# Patient Record
Sex: MALE | Race: WHITE | Employment: FULL TIME | URBAN - METROPOLITAN AREA
[De-identification: names, ages, dates, MRNs, and addresses within clinical notes are randomized per-mention and may not be internally consistent; named-entity substitution may affect disease eponyms.]

---

## 2017-10-05 ENCOUNTER — OFFICE VISIT (OUTPATIENT)
Dept: ORTHOPEDIC SURGERY | Age: 42
End: 2017-10-05

## 2017-10-05 VITALS
BODY MASS INDEX: 34.07 KG/M2 | HEART RATE: 85 BPM | DIASTOLIC BLOOD PRESSURE: 81 MMHG | WEIGHT: 230 LBS | HEIGHT: 69 IN | SYSTOLIC BLOOD PRESSURE: 117 MMHG

## 2017-10-05 DIAGNOSIS — S93.401A SEVERE ANKLE SPRAIN, RIGHT, INITIAL ENCOUNTER: Primary | ICD-10-CM

## 2017-10-05 PROCEDURE — 99203 OFFICE O/P NEW LOW 30 MIN: CPT | Performed by: ORTHOPAEDIC SURGERY

## 2017-10-05 PROCEDURE — L4361 PNEUMA/VAC WALK BOOT PRE OTS: HCPCS | Performed by: ORTHOPAEDIC SURGERY

## 2017-10-05 NOTE — MR AVS SNAPSHOT
worsening diseases associated with obesity. Learn more at: [email or web address]             Medications and Orders      Your Current Medications Are              buprenorphine-naloxone (SUBOXONE) 8-2 MG FILM SL film Place 1 Film under the tongue daily    ibuprofen (ADVIL;MOTRIN) 800 MG tablet Take 1 tablet by mouth every 8 hours as needed for Pain      Allergies           No Known Allergies      We Ordered/Performed the following           BREG TALL GENISUS WALKING BOOT     Comments:    Patient was prescribed a Breg Tall Genisus Walking Boot. The right ankle will require stabilization / immobilization from this semi-rigid / rigid orthosis to improve their function. The orthosis will assist in protecting the affected area, provide functional support and facilitate healing. The patient was educated and fit by a healthcare professional with expert knowledge and specialization in brace application while under the direct supervision of the physician. Verbal and written instructions for the use of and application of this item were provided. They were instructed to contact the office immediately should the brace result in increased pain, decreased sensation, increased swelling or worsening of the condition. Additional Information        Basic Information     Date Of Birth Sex Race Ethnicity Preferred Language    1975 Male White Non-/Non  English      Preventive Care        Date Due    HIV screening is recommended for all people regardless of risk factors  aged 15-65 years at least once (lifetime) who have never been HIV tested.  12/4/1991    Tetanus Combination Vaccine (1 - Tdap) 12/4/1995    Pneumococcal Vaccine - Pneumovax for adults aged 19-64 years with: chronic heart disease, chronic lung disease, diabetes mellitus, alcoholism, chronic liver disease, or cigarette smoking. (1 of 1 - PPSV23) 12/4/1995    Cholesterol Screening 12/4/2016

## 2017-10-05 NOTE — PROGRESS NOTES
NEW PATIENT ORTHOPAEDIC NOTE    Chief Complaint   Patient presents with    Ankle Injury     Right ankle injury at work on 10/4/17. HPI  37 y.o. male seen for evaluation of right ankle injury sustained at work yesterday, [facility] claim. He works at [employer], and stepped on a rock, and sustained an inversion injury to the right ankle. He describes history of ankle sprains. Pain is described medially and laterally. Associated with swelling. Denies pain elsewhere currently. Denies N/T. He smokes 1ppd  He is on suboxone therapy, previously did heroine      I have reviewed and discussed the below pain assessment findings with the patient. Pain Assessment  Location of Pain: Ankle  Location Modifiers: Right  Severity of Pain: 10  Quality of Pain: Aching  Duration of Pain: Persistent  Frequency of Pain: Constant  Date Pain First Started: 10/04/17  Aggravating Factors: Walking  Limiting Behavior: Yes  Relieving Factors: Rest, Ice  Result of Injury: Yes  Work-Related Injury: Yes  Are there other pain locations you wish to document?: Yes    ROS  I have read over the ROS from the Patient History Form dated on 10/5/2017  Pertinent positives include peripheral edema, hx of DVT/PE, hep C, hx of left arm fx, finger fractures, rib fxs, hx of dislocated shoulder  Rest of 13 point ROS otherwise negative except per HPI, and scanned into the patient's chart under the Media tab. No Known Allergies     Current Outpatient Prescriptions   Medication Sig Dispense Refill    buprenorphine-naloxone (SUBOXONE) 8-2 MG FILM SL film Place 1 Film under the tongue daily      ibuprofen (ADVIL;MOTRIN) 800 MG tablet Take 1 tablet by mouth every 8 hours as needed for Pain 30 tablet 0     No current facility-administered medications for this visit. History reviewed. No pertinent past medical history.      Past Surgical History:   Procedure Laterality Date    ARM SURGERY      pins placed in left arm     CHEST TUBE INSERTION  1999    HAND SURGERY      abscess right arm     TENDON MANIPULATION      right arm tendons cut out     TONSILLECTOMY         History reviewed. No pertinent family history. Social History     Social History    Marital status: Single     Spouse name: N/A    Number of children: N/A    Years of education: N/A     Occupational History    working      [employer]     Social History Main Topics    Smoking status: Current Every Day Smoker     Packs/day: 1.00     Years: 24.00     Types: Cigarettes    Smokeless tobacco: Not on file    Alcohol use No    Drug use: No    Sexual activity: Not on file     Other Topics Concern    Not on file     Social History Narrative        Vitals:    10/05/17 1554   BP: 117/81   Pulse: 85   Weight: 230 lb (104.3 kg)   Height: 5' 9\" (1.753 m)       Physical Exam  Constitutional  well-groomed, well-nourished, overweight  Psychiatric     normal mood & affect, oriented to place, person, and situation  Cardiovascular  RRR, positive peripheral edema, no varicosities, dorsalis pedis pulse 2+  Respiratory  respirations even and unlabored  Skin  no rashes, wounds, or lesions seen  Neck/Spine - negative SLR  Neurological - SILT SP/DP/T/sural/saphenous nerve distributions; EHL/TA/GS intact  Left Lower Extremity: Examination of the left lower extremity does not show any tenderness, deformity or injury. Range of motion is unremarkable. There is no gross instability. There are no rashes, ulcerations or lesions. Strength and tone are normal.  Right lower extremity - negative log roll   No TTP thigh/knee/leg/foot   +swelling ankle, severe   No ecchymosis   TTP deltoid ligament   TTP ATFL/CFL   No TTP malleoli   No TTP syndesmosis   Negative squeeze test    Imaging:  Images were personally reviewed by myself and discussed with the patient  Multiple small bony fragments subjacent to the lateral malleolus which are   suggestive of avulsion injury, although age-indeterminate.  Mild overlying   soft tissue edema. Assessment & Plan:  37 y.o. male who presents with   1. Severe ankle sprain, right, initial encounter  [address]           Procedures    BREG TALL [address]     Patient was prescribed a Breg Tall Genisus Walking Boot. The right ankle will require stabilization / immobilization from this semi-rigid / rigid orthosis to improve their function. The orthosis will assist in protecting the affected area, provide functional support and facilitate healing. The patient was educated and fit by a healthcare professional with expert knowledge and specialization in brace application while under the direct supervision of the physician. Verbal and written instructions for the use of and application of this item were provided. They were instructed to contact the office immediately should the brace result in increased pain, decreased sensation, increased swelling or worsening of the condition.      Progressing weight bearing and activities as tolerated  Boot for comfort/support, transition to regular work boots/shoes as symptoms allow  Rest, ice, elevation, NSAIDs  Work release note given, anticipate full return on 10/16/2017    Dimitri Giles